# Patient Record
Sex: FEMALE | Race: WHITE
[De-identification: names, ages, dates, MRNs, and addresses within clinical notes are randomized per-mention and may not be internally consistent; named-entity substitution may affect disease eponyms.]

---

## 2017-11-21 ENCOUNTER — HOSPITAL ENCOUNTER (EMERGENCY)
Dept: HOSPITAL 10 - FTE | Age: 37
Discharge: HOME | End: 2017-11-21
Payer: COMMERCIAL

## 2017-11-21 VITALS
BODY MASS INDEX: 20.81 KG/M2 | WEIGHT: 110.23 LBS | HEIGHT: 61 IN | WEIGHT: 110.23 LBS | BODY MASS INDEX: 20.81 KG/M2 | HEIGHT: 61 IN

## 2017-11-21 VITALS
DIASTOLIC BLOOD PRESSURE: 77 MMHG | SYSTOLIC BLOOD PRESSURE: 128 MMHG | RESPIRATION RATE: 18 BRPM | TEMPERATURE: 98.1 F | HEART RATE: 80 BPM

## 2017-11-21 DIAGNOSIS — S00.11XA: Primary | ICD-10-CM

## 2017-11-21 DIAGNOSIS — S00.12XA: ICD-10-CM
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
## 2017-11-21 DIAGNOSIS — R23.8: ICD-10-CM
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
## 2017-11-21 DIAGNOSIS — F15.10: ICD-10-CM

## 2017-11-21 PROCEDURE — 99284 EMERGENCY DEPT VISIT MOD MDM: CPT

## 2017-11-21 NOTE — ERD
ER Documentation


Chief Complaint


Chief Complaint


bilat feet pain, growth on toe, "if there's time to check, vag/rectal bleed





HPI


6-year-old female comes in with bilateral foot blisters, vaginal bleeding.  

Patient has a history of methamphetamine abuse and drinks alcohol, patient 

reports that she is gotten blisters on her feet over the last few days that are 

painful.  They are to both of her feet, she denies any trauma.  She also states 

that she has had vaginal bleeding over the last 3 days going through about 3 

pads.  She denies any headaches, loss conscious or vomiting associated but also 

has contusions to both of her eyes.





ROS


All systems reviewed and are negative except as per history of present illness.





Medications


Home Meds


Active Scripts


Naproxen* (Naprosyn*) 500 Mg Tablet, 500 MG PO BID Y for PAIN AND/OR 

INFLAMMATION, #30 TAB


   Prov:KELVIN BELL PA-C         17


Bacitracin* (Bacitracin Zinc Oint*) 28.35 Gm Oint, 1 APPLIC TOP BID, #1 TUB


   APPLI TO


   Prov:KELVIN BELL PA-C         17





Allergies


Allergies:  


Coded Allergies:  


     No Known Allergy (Unverified , 17)





PMhx/Soc


Medical and Surgical Hx:  pt denies Medical Hx, pt denies Surgical Hx


Hx Alcohol Use:  Yes


Hx Substance Use:  Yes


Hx Tobacco Use:  No


Smoking Status:  Never smoker





Physical Exam


Vitals





Vital Signs








  Date Time  Temp Pulse Resp B/P Pulse Ox O2 Delivery O2 Flow Rate FiO2


 


17 04:20 97.3 88 18 137/84 100   








Physical Exam


General: Disheveled in appearance, no acute distress


HEENT: Head is normocephalic, atraumatic. No scleral icterus.  Pupils are equal

, round, and reactive.  Periorbital ecchymosis of both eyes.  Extraocular 

movements intact.  Oral mucous membranes are moist.  


Neck: Supple.  Nontender.


Lungs: Clear to auscultation.  Normal air movement.


Heart: Regular rate and rhythm.  S1 and S2 are normal.  No murmurs, gallops, or 

rubs.


Abdomen: Soft, nontender, nondistended.  Bowel sounds are normoactive.


: Vaginal examination shows evidence of blood that is on the external vagina, 

there is no active hemorrhage.


Extremities: No clubbing or cyanosis.  Normal pulses. Moving extremities x 4. 

No weakness.


Neurologic: Alert and oriented 3.  No focal deficits.


Skin: Both of the bottom of her feet have multiple blisters.





Procedures/MDM


ED course:


The patient's feet are irrigated with normal saline and hydrogen peroxide.





Urine pregnancy is negative.





Medical decision makin-year-old female comes in with periorbital contusions

, and has been over a week, no neurologic symptoms, no history of loss 

consciousness or vomiting her eye examination is normal patient's urine 

pregnancy is negative.  She does not appear to present with active bleeding, no 

evidence of pregnancy or ectopic pregnancy, PID or cervicitis.  Fever blisters 

are likely due to patient's lack of care of her feet, she is disheveled.  We 

irrigated her feet, cleaned with hydrogen peroxide and she will be given 

bacitracin to apply.  Patient also be given Naprosyn for pain.  She was given 

resources for abuse, and housing as well as adult care.





Departure


Diagnosis:  


 Primary Impression:  


 Blister of foot


 Additional Impressions:  


 Contusion, periorbital


 Methamphetamine abuse


Condition:  Good


Patient Instructions:  Blister, Contusion, Eye











KELVIN BELL PA-C 2017 06:50

## 2018-02-04 ENCOUNTER — HOSPITAL ENCOUNTER (EMERGENCY)
Dept: HOSPITAL 91 - E/R | Age: 38
LOS: 1 days | Discharge: HOME | End: 2018-02-05
Payer: COMMERCIAL

## 2018-02-04 ENCOUNTER — HOSPITAL ENCOUNTER (EMERGENCY)
Age: 38
LOS: 1 days | Discharge: HOME | End: 2018-02-05

## 2018-02-04 DIAGNOSIS — F10.929: Primary | ICD-10-CM

## 2018-02-04 DIAGNOSIS — R41.0: ICD-10-CM

## 2018-02-04 DIAGNOSIS — F17.210: ICD-10-CM

## 2018-02-04 DIAGNOSIS — N30.00: ICD-10-CM

## 2018-02-04 PROCEDURE — 99285 EMERGENCY DEPT VISIT HI MDM: CPT

## 2018-02-04 PROCEDURE — 81003 URINALYSIS AUTO W/O SCOPE: CPT

## 2018-02-04 PROCEDURE — 81001 URINALYSIS AUTO W/SCOPE: CPT

## 2018-02-04 PROCEDURE — 70450 CT HEAD/BRAIN W/O DYE: CPT

## 2018-02-04 PROCEDURE — 87086 URINE CULTURE/COLONY COUNT: CPT

## 2018-02-04 RX ADMIN — THIAMINE HYDROCHLORIDE 1 MLS/HR: 100 INJECTION, SOLUTION INTRAMUSCULAR; INTRAVENOUS at 23:57

## 2018-02-05 LAB
ADD UMIC: YES
UR ASCORBIC ACID: 40 MG/DL
UR BACTERIA: (no result) /HPF
UR BILIRUBIN (DIP): NEGATIVE MG/DL
UR BLOOD (DIP): NEGATIVE MG/DL
UR CLARITY: (no result)
UR COLOR: YELLOW
UR GLUCOSE (DIP): (no result) MG/DL
UR KETONES (DIP): (no result) MG/DL
UR LEUKOCYTE ESTERASE (DIP): (no result) LEU/UL
UR NITRITE (DIP): NEGATIVE MG/DL
UR PH (DIP): 5 (ref 5–9)
UR RBC: 6 /HPF (ref 0–5)
UR SPECIFIC GRAVITY (DIP): 1.02 (ref 1–1.03)
UR SQUAMOUS EPITHELIAL CELL: (no result) /HPF
UR TOTAL PROTEIN (DIP): NEGATIVE MG/DL
UR UROBILINOGEN (DIP): (no result) MG/DL
UR WBC: > 182 /HPF (ref 0–5)
URINE BLOOD (DIP) POC: (no result)
URINE BLOOD (DIP) POC: (no result)
URINE LEUKOCYTE EST (DIP) POC: (no result)
URINE LEUKOCYTE EST (DIP) POC: (no result)
URINE PH (DIP) POC: 5.5 (ref 5–8.5)
URINE PH (DIP) POC: 5.5 (ref 5–8.5)

## 2018-02-08 NOTE — NUR
PT BIBA#[address] FROM [address], PT FOUND SLEEPING WITH BOTTLE OF VODKA  NEXT TO PT, PER 
EMS. NAD NOTED, VSS, RESP EVEN AND UNLABORED, PT WAS PUT ON MONITOR. WAITING 
FOR MD BARAJAS.

## 2018-02-09 NOTE — NUR
PT A/OX3. PT ABLE TO PROVIDE NAME  AND SS. PT AMBULATORY WITH STEADY GAIT. 
PT PROVIDED WITH FOOD AND DRINK.

## 2018-02-09 NOTE — NUR
PT OK TO DISCHARGE PER DR WEBER. ATTEMPTED TO PROVIDE ACI. UPON DISCHARGE PT 
STARTED YELLING AND CURSING AT STAFF. PT ESCORTED OUT OF ER WITH SECURITY.

## 2018-03-29 NOTE — NUR
PT STARTED DEMANDING FOR MILK AS SOON AS SHE WAS TAKEN OFF THE GURNEY. DENIES 
ANY SX'S AT THIS TIME. IN THE LOBBY.

## 2018-06-26 NOTE — NUR
PATIENT REFUSING ALL MEDICATIONS DESPITE NUMEROUS ATTEMPTS AND EXPLANATION OF 
BENEFITS. STATING SHE FEELS BETTER NOW AND DOES NOT WANT ANYTHING AT THIS TIME. 
 MD INFORMED.

## 2018-06-26 NOTE — NUR
IV removed. Catheter intact and site benign. Pressure and 4x4 applied to site. 
No bleeding noted.  Patient discharged to home in stable condition. Patient 
left hospital prior to signing discharge instructions.

## 2018-06-26 NOTE — NUR
BIBRA 878 C/O N/V WITH DIZZINESS. HX OF ETOH ABUSE, LAST DRINK AT 0200 LAST 
NIGHT.  PATIENT IS A/OX 3. BREATHING EVEN AND UNLABORED. NO SOB, NAD, VITALS 
STABLE. SAFETY AND COMFORT MEASURES IN PLACE. AWAITING MD ORDERS.

## 2019-08-14 ENCOUNTER — HOSPITAL ENCOUNTER (EMERGENCY)
Dept: HOSPITAL 54 - ER | Age: 39
LOS: 1 days | Discharge: LEFT BEFORE DISCHARGE | End: 2019-08-15
Payer: COMMERCIAL

## 2019-08-14 DIAGNOSIS — Z53.21: Primary | ICD-10-CM

## 2019-11-05 ENCOUNTER — HOSPITAL ENCOUNTER (EMERGENCY)
Dept: HOSPITAL 10 - E/R | Age: 39
LOS: 1 days | Discharge: HOME | End: 2019-11-06
Payer: MEDICAID

## 2019-11-05 VITALS
HEIGHT: 61 IN | BODY MASS INDEX: 22.43 KG/M2 | WEIGHT: 118.83 LBS | WEIGHT: 118.83 LBS | HEIGHT: 61 IN | BODY MASS INDEX: 22.43 KG/M2

## 2019-11-05 DIAGNOSIS — F10.230: Primary | ICD-10-CM

## 2019-11-05 DIAGNOSIS — Z87.891: ICD-10-CM

## 2019-11-05 DIAGNOSIS — R40.2252: ICD-10-CM

## 2019-11-05 DIAGNOSIS — R40.2142: ICD-10-CM

## 2019-11-05 DIAGNOSIS — R40.2362: ICD-10-CM

## 2019-11-05 DIAGNOSIS — E86.0: ICD-10-CM

## 2019-11-05 PROCEDURE — 85025 COMPLETE CBC W/AUTO DIFF WBC: CPT

## 2019-11-05 PROCEDURE — 80053 COMPREHEN METABOLIC PANEL: CPT

## 2019-11-05 PROCEDURE — 96375 TX/PRO/DX INJ NEW DRUG ADDON: CPT

## 2019-11-05 PROCEDURE — 83690 ASSAY OF LIPASE: CPT

## 2019-11-05 PROCEDURE — 81025 URINE PREGNANCY TEST: CPT

## 2019-11-05 PROCEDURE — 36415 COLL VENOUS BLD VENIPUNCTURE: CPT

## 2019-11-05 PROCEDURE — 96374 THER/PROPH/DIAG INJ IV PUSH: CPT

## 2019-11-06 VITALS — DIASTOLIC BLOOD PRESSURE: 69 MMHG | SYSTOLIC BLOOD PRESSURE: 114 MMHG | RESPIRATION RATE: 14 BRPM | HEART RATE: 88 BPM

## 2020-10-25 ENCOUNTER — HOSPITAL ENCOUNTER (EMERGENCY)
Dept: HOSPITAL 54 - ER | Age: 40
Discharge: HOME | End: 2020-10-25
Payer: MEDICAID

## 2020-10-25 VITALS — HEIGHT: 61 IN | WEIGHT: 120 LBS | BODY MASS INDEX: 22.66 KG/M2

## 2020-10-25 VITALS — SYSTOLIC BLOOD PRESSURE: 154 MMHG | DIASTOLIC BLOOD PRESSURE: 105 MMHG

## 2020-10-25 DIAGNOSIS — F19.10: Primary | ICD-10-CM
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
## 2020-10-25 DIAGNOSIS — F10.10: ICD-10-CM

## 2020-10-25 DIAGNOSIS — Z59.0: ICD-10-CM

## 2020-10-25 SDOH — ECONOMIC STABILITY - HOUSING INSECURITY: HOMELESSNESS: Z59.0

## 2020-10-25 NOTE — NUR
Patient given written and verbal discharge instructions. Patient verbalizes 
understanding of instructions. Patient is ambulatory with steady gait.  Patient 
given list of available shelters in surrounding area. food provided. pt has 
proper clothing on.

## 2025-05-08 NOTE — NUR
PATIENT IS RESTING IN ER BED, NO DISTRESS NOTED, SKINW ARM AND DRY. WILL 
CONTINUE TO MONITOR Quality 431: Preventive Care And Screening: Unhealthy Alcohol Use - Screening: Patient not identified as an unhealthy alcohol user when screened for unhealthy alcohol use using a systematic screening method Quality 226: Preventive Care And Screening: Tobacco Use: Screening And Cessation Intervention: Patient screened for tobacco use and is an ex/non-smoker Quality 130: Documentation Of Current Medications In The Medical Record: Current Medications Documented Detail Level: Detailed